# Patient Record
Sex: FEMALE
[De-identification: names, ages, dates, MRNs, and addresses within clinical notes are randomized per-mention and may not be internally consistent; named-entity substitution may affect disease eponyms.]

---

## 2017-02-01 ENCOUNTER — APPOINTMENT (OUTPATIENT)
Dept: ANTEPARTUM | Facility: CLINIC | Age: 32
End: 2017-02-01

## 2017-02-01 ENCOUNTER — ASOB RESULT (OUTPATIENT)
Age: 32
End: 2017-02-01

## 2017-03-14 ENCOUNTER — APPOINTMENT (OUTPATIENT)
Dept: ANTEPARTUM | Facility: CLINIC | Age: 32
End: 2017-03-14

## 2017-03-19 ENCOUNTER — ASOB RESULT (OUTPATIENT)
Age: 32
End: 2017-03-19

## 2017-03-19 ENCOUNTER — APPOINTMENT (OUTPATIENT)
Dept: ANTEPARTUM | Facility: CLINIC | Age: 32
End: 2017-03-19

## 2017-04-18 ENCOUNTER — OUTPATIENT (OUTPATIENT)
Dept: OUTPATIENT SERVICES | Facility: HOSPITAL | Age: 32
LOS: 1 days | Discharge: HOME | End: 2017-04-18

## 2017-04-18 LAB
APPEARANCE UR: CLEAR — SIGNIFICANT CHANGE UP
BACTERIA # UR AUTO: (no result)
BASOPHILS # BLD AUTO: 0.1 K/UL — SIGNIFICANT CHANGE UP (ref 0–0.2)
BASOPHILS NFR BLD AUTO: 0.7 % — SIGNIFICANT CHANGE UP (ref 0–2)
BILIRUB UR-MCNC: NEGATIVE — SIGNIFICANT CHANGE UP
BLD GP AB SCN SERPL QL: SIGNIFICANT CHANGE UP
COD CRY URNS QL: (no result)
COLOR SPEC: YELLOW — SIGNIFICANT CHANGE UP
DIFF PNL FLD: (no result)
EOSINOPHIL # BLD AUTO: 0.1 K/UL — SIGNIFICANT CHANGE UP (ref 0–0.5)
EOSINOPHIL NFR BLD AUTO: 0.8 % — SIGNIFICANT CHANGE UP (ref 0–6)
EPI CELLS # UR: (no result)
GLUCOSE UR QL: NEGATIVE MG/DL — SIGNIFICANT CHANGE UP
HCT VFR BLD CALC: 39.4 % — SIGNIFICANT CHANGE UP (ref 34.5–45)
HGB BLD-MCNC: 12.7 G/DL — SIGNIFICANT CHANGE UP (ref 11.5–15.5)
KETONES UR-MCNC: NEGATIVE — SIGNIFICANT CHANGE UP
LEUKOCYTE ESTERASE UR-ACNC: (no result)
LYMPHOCYTES # BLD AUTO: 18.3 % — SIGNIFICANT CHANGE UP (ref 13–44)
LYMPHOCYTES # BLD AUTO: 2.5 K/UL — SIGNIFICANT CHANGE UP (ref 1–3.3)
MCHC RBC-ENTMCNC: 30.4 PG — SIGNIFICANT CHANGE UP (ref 27–34)
MCHC RBC-ENTMCNC: 32.3 GM/DL — SIGNIFICANT CHANGE UP (ref 32–36)
MCV RBC AUTO: 94 FL — SIGNIFICANT CHANGE UP (ref 80–100)
MONOCYTES # BLD AUTO: 0.7 K/UL — SIGNIFICANT CHANGE UP (ref 0–0.9)
MONOCYTES NFR BLD AUTO: 5.2 % — SIGNIFICANT CHANGE UP (ref 2–14)
NEUTROPHILS # BLD AUTO: 10.2 K/UL — HIGH (ref 1.8–7.4)
NEUTROPHILS NFR BLD AUTO: 75 % — SIGNIFICANT CHANGE UP (ref 43–77)
NITRITE UR-MCNC: NEGATIVE — SIGNIFICANT CHANGE UP
PH UR: 6 — SIGNIFICANT CHANGE UP (ref 4.8–8)
PLATELET # BLD AUTO: 163 K/UL — SIGNIFICANT CHANGE UP (ref 150–400)
PROT UR-MCNC: NEGATIVE MG/DL — SIGNIFICANT CHANGE UP
RBC # BLD: 4.19 M/UL — SIGNIFICANT CHANGE UP (ref 3.8–5.2)
RBC # FLD: 12.8 % — SIGNIFICANT CHANGE UP (ref 10.3–14.5)
RBC CASTS # UR COMP ASSIST: (no result) /HPF (ref 0–4)
SP GR SPEC: 1.01 — SIGNIFICANT CHANGE UP (ref 1.01–1.02)
T PALLIDUM AB TITR SER: NEGATIVE — SIGNIFICANT CHANGE UP
TYPE + AB SCN PNL BLD: SIGNIFICANT CHANGE UP
UROBILINOGEN FLD QL: NEGATIVE MG/DL — SIGNIFICANT CHANGE UP
WBC # BLD: 13.6 K/UL — HIGH (ref 3.8–10.5)
WBC # FLD AUTO: 13.6 K/UL — HIGH (ref 3.8–10.5)
WBC UR QL: SIGNIFICANT CHANGE UP

## 2017-04-19 ENCOUNTER — INPATIENT (INPATIENT)
Facility: HOSPITAL | Age: 32
LOS: 2 days | Discharge: HOME | End: 2017-04-22
Attending: OBSTETRICS & GYNECOLOGY | Admitting: OBSTETRICS & GYNECOLOGY

## 2017-04-19 VITALS — WEIGHT: 138.89 LBS | HEIGHT: 62 IN

## 2017-04-19 LAB — T PALLIDUM AB TITR SER: NEGATIVE — SIGNIFICANT CHANGE UP

## 2017-04-19 RX ORDER — FERROUS SULFATE 325(65) MG
325 TABLET ORAL DAILY
Qty: 0 | Refills: 0 | Status: DISCONTINUED | OUTPATIENT
Start: 2017-04-19 | End: 2017-04-22

## 2017-04-19 RX ORDER — SODIUM CHLORIDE 9 MG/ML
1000 INJECTION, SOLUTION INTRAVENOUS ONCE
Qty: 0 | Refills: 0 | Status: COMPLETED | OUTPATIENT
Start: 2017-04-19 | End: 2017-04-19

## 2017-04-19 RX ORDER — ACETAMINOPHEN 500 MG
1000 TABLET ORAL EVERY 8 HOURS
Qty: 0 | Refills: 0 | Status: COMPLETED | OUTPATIENT
Start: 2017-04-19 | End: 2017-04-19

## 2017-04-19 RX ORDER — HYDROMORPHONE HYDROCHLORIDE 2 MG/ML
0.5 INJECTION INTRAMUSCULAR; INTRAVENOUS; SUBCUTANEOUS
Qty: 0 | Refills: 0 | Status: DISCONTINUED | OUTPATIENT
Start: 2017-04-19 | End: 2017-04-22

## 2017-04-19 RX ORDER — NALOXONE HYDROCHLORIDE 4 MG/.1ML
0.1 SPRAY NASAL
Qty: 0 | Refills: 0 | Status: DISCONTINUED | OUTPATIENT
Start: 2017-04-19 | End: 2017-04-22

## 2017-04-19 RX ORDER — DOCUSATE SODIUM 100 MG
100 CAPSULE ORAL
Qty: 0 | Refills: 0 | Status: DISCONTINUED | OUTPATIENT
Start: 2017-04-19 | End: 2017-04-22

## 2017-04-19 RX ORDER — DIPHENHYDRAMINE HCL 50 MG
25 CAPSULE ORAL EVERY 6 HOURS
Qty: 0 | Refills: 0 | Status: DISCONTINUED | OUTPATIENT
Start: 2017-04-19 | End: 2017-04-22

## 2017-04-19 RX ORDER — CEFAZOLIN SODIUM 1 G
2000 VIAL (EA) INJECTION ONCE
Qty: 0 | Refills: 0 | Status: COMPLETED | OUTPATIENT
Start: 2017-04-19 | End: 2017-04-19

## 2017-04-19 RX ORDER — OXYTOCIN 10 UNIT/ML
41.67 VIAL (ML) INJECTION
Qty: 20 | Refills: 0 | Status: DISCONTINUED | OUTPATIENT
Start: 2017-04-19 | End: 2017-04-22

## 2017-04-19 RX ORDER — OXYTOCIN 10 UNIT/ML
41.67 VIAL (ML) INJECTION
Qty: 20 | Refills: 0 | Status: DISCONTINUED | OUTPATIENT
Start: 2017-04-19 | End: 2017-04-19

## 2017-04-19 RX ORDER — MORPHINE SULFATE 50 MG/1
0.15 CAPSULE, EXTENDED RELEASE ORAL ONCE
Qty: 0 | Refills: 0 | Status: DISCONTINUED | OUTPATIENT
Start: 2017-04-19 | End: 2017-04-22

## 2017-04-19 RX ORDER — CITRIC ACID/SODIUM CITRATE 300-500 MG
30 SOLUTION, ORAL ORAL ONCE
Qty: 0 | Refills: 0 | Status: COMPLETED | OUTPATIENT
Start: 2017-04-19 | End: 2017-04-19

## 2017-04-19 RX ORDER — SODIUM CHLORIDE 9 MG/ML
1000 INJECTION, SOLUTION INTRAVENOUS
Qty: 0 | Refills: 0 | Status: DISCONTINUED | OUTPATIENT
Start: 2017-04-19 | End: 2017-04-19

## 2017-04-19 RX ORDER — ONDANSETRON 8 MG/1
4 TABLET, FILM COATED ORAL EVERY 6 HOURS
Qty: 0 | Refills: 0 | Status: DISCONTINUED | OUTPATIENT
Start: 2017-04-19 | End: 2017-04-22

## 2017-04-19 RX ORDER — SIMETHICONE 80 MG/1
80 TABLET, CHEWABLE ORAL EVERY 4 HOURS
Qty: 0 | Refills: 0 | Status: DISCONTINUED | OUTPATIENT
Start: 2017-04-19 | End: 2017-04-22

## 2017-04-19 RX ORDER — HYDROMORPHONE HYDROCHLORIDE 2 MG/ML
1.5 INJECTION INTRAMUSCULAR; INTRAVENOUS; SUBCUTANEOUS
Qty: 0 | Refills: 0 | Status: DISCONTINUED | OUTPATIENT
Start: 2017-04-19 | End: 2017-04-22

## 2017-04-19 RX ORDER — TETANUS TOXOID, REDUCED DIPHTHERIA TOXOID AND ACELLULAR PERTUSSIS VACCINE, ADSORBED 5; 2.5; 8; 8; 2.5 [IU]/.5ML; [IU]/.5ML; UG/.5ML; UG/.5ML; UG/.5ML
0.5 SUSPENSION INTRAMUSCULAR ONCE
Qty: 0 | Refills: 0 | Status: DISCONTINUED | OUTPATIENT
Start: 2017-04-19 | End: 2017-04-22

## 2017-04-19 RX ORDER — ACETAMINOPHEN 500 MG
650 TABLET ORAL EVERY 6 HOURS
Qty: 0 | Refills: 0 | Status: DISCONTINUED | OUTPATIENT
Start: 2017-04-19 | End: 2017-04-22

## 2017-04-19 RX ORDER — IBUPROFEN 200 MG
600 TABLET ORAL EVERY 6 HOURS
Qty: 0 | Refills: 0 | Status: DISCONTINUED | OUTPATIENT
Start: 2017-04-19 | End: 2017-04-22

## 2017-04-19 RX ORDER — LANOLIN
1 OINTMENT (GRAM) TOPICAL
Qty: 0 | Refills: 0 | Status: DISCONTINUED | OUTPATIENT
Start: 2017-04-19 | End: 2017-04-22

## 2017-04-19 RX ORDER — METOCLOPRAMIDE HCL 10 MG
10 TABLET ORAL ONCE
Qty: 0 | Refills: 0 | Status: DISCONTINUED | OUTPATIENT
Start: 2017-04-19 | End: 2017-04-22

## 2017-04-19 RX ORDER — GLYCERIN ADULT
1 SUPPOSITORY, RECTAL RECTAL AT BEDTIME
Qty: 0 | Refills: 0 | Status: DISCONTINUED | OUTPATIENT
Start: 2017-04-19 | End: 2017-04-22

## 2017-04-19 RX ORDER — OXYTOCIN 10 UNIT/ML
333.33 VIAL (ML) INJECTION
Qty: 20 | Refills: 0 | Status: COMPLETED | OUTPATIENT
Start: 2017-04-19 | End: 2017-04-19

## 2017-04-19 RX ADMIN — Medication 325 MILLIGRAM(S): at 21:07

## 2017-04-19 RX ADMIN — Medication 100 MILLIGRAM(S): at 08:35

## 2017-04-19 RX ADMIN — Medication 600 MILLIGRAM(S): at 21:07

## 2017-04-19 RX ADMIN — SODIUM CHLORIDE 125 MILLILITER(S): 9 INJECTION, SOLUTION INTRAVENOUS at 10:14

## 2017-04-19 RX ADMIN — Medication 30 MILLILITER(S): at 10:15

## 2017-04-19 RX ADMIN — Medication 1 TABLET(S): at 21:07

## 2017-04-19 RX ADMIN — Medication 400 MILLIGRAM(S): at 11:09

## 2017-04-19 RX ADMIN — SODIUM CHLORIDE 125 MILLILITER(S): 9 INJECTION, SOLUTION INTRAVENOUS at 18:22

## 2017-04-19 RX ADMIN — HYDROMORPHONE HYDROCHLORIDE 1.5 MILLIGRAM(S): 2 INJECTION INTRAMUSCULAR; INTRAVENOUS; SUBCUTANEOUS at 10:35

## 2017-04-19 RX ADMIN — Medication 100 MILLIGRAM(S): at 21:07

## 2017-04-19 RX ADMIN — Medication 1000 MILLIUNIT(S)/MIN: at 10:51

## 2017-04-19 RX ADMIN — Medication 125 MILLIUNIT(S)/MIN: at 10:52

## 2017-04-19 RX ADMIN — Medication 600 MILLIGRAM(S): at 21:49

## 2017-04-19 RX ADMIN — SODIUM CHLORIDE 2000 MILLILITER(S): 9 INJECTION, SOLUTION INTRAVENOUS at 10:13

## 2017-04-20 LAB
BASOPHILS # BLD AUTO: 0.1 K/UL — SIGNIFICANT CHANGE UP (ref 0–0.2)
BASOPHILS NFR BLD AUTO: 0.7 % — SIGNIFICANT CHANGE UP (ref 0–2)
EOSINOPHIL # BLD AUTO: 0.1 K/UL — SIGNIFICANT CHANGE UP (ref 0–0.5)
EOSINOPHIL NFR BLD AUTO: 0.6 % — SIGNIFICANT CHANGE UP (ref 0–6)
HCT VFR BLD CALC: 31.4 % — LOW (ref 34.5–45)
HGB BLD-MCNC: 10.1 G/DL — LOW (ref 11.5–15.5)
LYMPHOCYTES # BLD AUTO: 22.9 % — SIGNIFICANT CHANGE UP (ref 13–44)
LYMPHOCYTES # BLD AUTO: 3.2 K/UL — SIGNIFICANT CHANGE UP (ref 1–3.3)
MCHC RBC-ENTMCNC: 30.2 PG — SIGNIFICANT CHANGE UP (ref 27–34)
MCHC RBC-ENTMCNC: 32.3 GM/DL — SIGNIFICANT CHANGE UP (ref 32–36)
MCV RBC AUTO: 93.5 FL — SIGNIFICANT CHANGE UP (ref 80–100)
MONOCYTES # BLD AUTO: 0.9 K/UL — SIGNIFICANT CHANGE UP (ref 0–0.9)
MONOCYTES NFR BLD AUTO: 6.6 % — SIGNIFICANT CHANGE UP (ref 2–14)
NEUTROPHILS # BLD AUTO: 9.6 K/UL — HIGH (ref 1.8–7.4)
NEUTROPHILS NFR BLD AUTO: 69.2 % — SIGNIFICANT CHANGE UP (ref 43–77)
PLATELET # BLD AUTO: 140 K/UL — LOW (ref 150–400)
RBC # BLD: 3.35 M/UL — LOW (ref 3.8–5.2)
RBC # FLD: 12.6 % — SIGNIFICANT CHANGE UP (ref 10.3–14.5)
WBC # BLD: 13.9 K/UL — HIGH (ref 3.8–10.5)
WBC # FLD AUTO: 13.9 K/UL — HIGH (ref 3.8–10.5)

## 2017-04-20 RX ADMIN — Medication 1 TABLET(S): at 12:14

## 2017-04-20 RX ADMIN — Medication 100 MILLIGRAM(S): at 18:34

## 2017-04-20 RX ADMIN — Medication 600 MILLIGRAM(S): at 12:14

## 2017-04-20 RX ADMIN — Medication 600 MILLIGRAM(S): at 19:20

## 2017-04-20 RX ADMIN — Medication 600 MILLIGRAM(S): at 06:05

## 2017-04-20 RX ADMIN — Medication 600 MILLIGRAM(S): at 18:34

## 2017-04-20 RX ADMIN — Medication 600 MILLIGRAM(S): at 07:09

## 2017-04-20 NOTE — PROGRESS NOTE ADULT - SUBJECTIVE AND OBJECTIVE BOX
Postpartum Note,  Section  She is a  31y woman who is now post-operative day: #1    Subjective:  The patient feels well.  She is ambulating.   She is tolerating regular diet.  She denies nausea and vomiting.  She is voiding.  Her pain is controlled.  She reports normal postpartum bleeding.        Physical exam:    Vital Signs Last 24 Hrs  T(C): 36.5, Max: 36.8 (04-19 @ 12:16)  T(F): 97.7, Max: 98.2 (04-19 @ 12:16)  HR: 68 (57 - 69)  BP: 107/56 (105/56 - 136/70)  BP(mean): --  RR: 16 (16 - 18)  SpO2: 97% (97% - 98%)    Gen: NAD  Breast: Soft, nontender, not engorged.  Abdomen: Soft, nontender, no distension , firm uterine fundus at umbilicus.  Incision: Clean, dry, and intact with steri strips  Pelvic: Normal lochia noted  Ext: No calf tenderness    LABS:                        10.1   13.9  )-----------( 140      ( 2017 05:49 )             31.4       Rubella status:     Allergies    No Known Drug Allergies  Tree Nuts (Other)    Intolerances      MEDICATIONS  (STANDING):  diphtheria/tetanus/pertussis (acellular) Vaccine (ADAcel) 0.5milliLiter(s) IntraMuscular once  oxytocin Infusion 41.667milliUNIT(s)/Min IV Continuous <Continuous>  ferrous    sulfate 325milliGRAM(s) Oral daily  prenatal multivitamin 1Tablet(s) Oral daily  morphine PF Spinal 0.15milliGRAM(s) IntraThecal. once    MEDICATIONS  (PRN):  metoclopramide Injectable 10milliGRAM(s) IV Push once PRN Nausea and/or Vomiting  acetaminophen   Tablet 650milliGRAM(s) Oral every 6 hours PRN For Temp greater than 38.5 C (101.3 F)  ibuprofen  Tablet 600milliGRAM(s) Oral every 6 hours PRN Mild pain or headache  oxyCODONE  5 mG/acetaminophen 325 mG 1Tablet(s) Oral every 3 hours PRN Moderate Pain  oxyCODONE  5 mG/acetaminophen 325 mG 2Tablet(s) Oral every 6 hours PRN Severe Pain  simethicone 80milliGRAM(s) Chew every 4 hours PRN Gas  diphenhydrAMINE   Capsule 25milliGRAM(s) Oral every 6 hours PRN Itching  glycerin Suppository - Adult 1Suppository(s) Rectal at bedtime PRN Constipation  docusate sodium 100milliGRAM(s) Oral two times a day PRN Stool Softening  lanolin Ointment 1Application(s) Topical every 3 hours PRN Sore Nipples  HYDROmorphone  Injectable 1.5milliGRAM(s) SubCutaneous every 3 hours PRN Severe Pain  HYDROmorphone  Injectable 0.5milliGRAM(s) IV Push every 10 minutes PRN Moderate Pain  naloxone Injectable 0.1milliGRAM(s) IV Push every 3 minutes PRN For ANY of the following changes in patient status:  A. RR LESS THAN 10 breaths per minute, B. Oxygen saturation LESS THAN 90%, C. Sedation score of 6  ondansetron Injectable 4milliGRAM(s) IV Push every 6 hours PRN Nausea        Assessment and Plan  POD # s/p 1  Doing well.  Encourage ambulation.  Cont progressive care

## 2017-04-21 LAB
HCT VFR BLD CALC: 30.7 % — LOW (ref 34.5–45)
HGB BLD-MCNC: 10.2 G/DL — LOW (ref 11.5–15.5)
MCHC RBC-ENTMCNC: 31 PG — SIGNIFICANT CHANGE UP (ref 27–34)
MCHC RBC-ENTMCNC: 33.1 GM/DL — SIGNIFICANT CHANGE UP (ref 32–36)
MCV RBC AUTO: 93.5 FL — SIGNIFICANT CHANGE UP (ref 80–100)
PLATELET # BLD AUTO: 155 K/UL — SIGNIFICANT CHANGE UP (ref 150–400)
RBC # BLD: 3.28 M/UL — LOW (ref 3.8–5.2)
RBC # FLD: 12.8 % — SIGNIFICANT CHANGE UP (ref 10.3–14.5)
WBC # BLD: 10.4 K/UL — SIGNIFICANT CHANGE UP (ref 3.8–10.5)
WBC # FLD AUTO: 10.4 K/UL — SIGNIFICANT CHANGE UP (ref 3.8–10.5)

## 2017-04-21 RX ORDER — MOMETASONE FUROATE 220 UG/1
1 INHALANT RESPIRATORY (INHALATION) DAILY
Qty: 0 | Refills: 0 | Status: DISCONTINUED | OUTPATIENT
Start: 2017-04-21 | End: 2017-04-22

## 2017-04-21 RX ORDER — MONTELUKAST 4 MG/1
10 TABLET, CHEWABLE ORAL AT BEDTIME
Qty: 0 | Refills: 0 | Status: DISCONTINUED | OUTPATIENT
Start: 2017-04-21 | End: 2017-04-22

## 2017-04-21 RX ADMIN — Medication 100 MILLIGRAM(S): at 10:41

## 2017-04-21 RX ADMIN — MONTELUKAST 10 MILLIGRAM(S): 4 TABLET, CHEWABLE ORAL at 22:19

## 2017-04-21 RX ADMIN — Medication 600 MILLIGRAM(S): at 14:21

## 2017-04-21 RX ADMIN — Medication 600 MILLIGRAM(S): at 12:33

## 2017-04-21 RX ADMIN — Medication 600 MILLIGRAM(S): at 18:45

## 2017-04-21 RX ADMIN — Medication 600 MILLIGRAM(S): at 07:26

## 2017-04-21 RX ADMIN — Medication 1 TABLET(S): at 12:32

## 2017-04-21 RX ADMIN — Medication 100 MILLIGRAM(S): at 22:19

## 2017-04-21 RX ADMIN — Medication 600 MILLIGRAM(S): at 19:28

## 2017-04-21 RX ADMIN — Medication 600 MILLIGRAM(S): at 06:36

## 2017-04-21 NOTE — PROGRESS NOTE ADULT - SUBJECTIVE AND OBJECTIVE BOX
Patient evaluated at bedside approx 915am .   She reports pain is well controlled with pain meds. She has no complaints  She denies headache, dizziness, chest pain, palpitations, shortness of breath, nausea, vomiting, heavy vaginal bleeding.  She has been ambulating without assistance, voiding spontaneously, tolerating diet, and is breastfeeding. She is passing flatus, no BM.    Physical Exam:  Vital Signs Last 24 Hrs  T(C): 36.6, Max: 36.8 (04-20 @ 16:09)  T(F): 97.8, Max: 98.3 (04-20 @ 16:09)  HR: 65 (64 - 71)  BP: 112/65 (102/63 - 113/71)  BP(mean): --  RR: 16 (16 - 16)  SpO2: 98% (97% - 98%)  I&O's Summary    I & Os for current day (as of 21 Apr 2017 09:52)  =============================================  IN: 240 ml / OUT: 300 ml / NET: -60 ml      NAD, A+0 x 3  Breasts: soft, nontender, no palpable masses, nipples intact and everted  Abd: + BS, soft, nontender, nondistended, no rebound or guarding, uterus firm at umbilicus  Pfannenstiel incision Clean, dry with staples  : lochia WNL  Extremities: no swelling or calf tenderness,                           10.1   13.9  )-----------( 140      ( 20 Apr 2017 05:49 )             31.4                         12.7   13.6  )-----------( 163      ( 18 Apr 2017 08:44 )             39.4                 Assessment:  PPD @ s/p repeat Csection doing well  labs wnl  breastfeeding well    Plan:  Continue current management  anticipate discharge home tomorrow

## 2017-04-21 NOTE — PROGRESS NOTE ADULT - SUBJECTIVE AND OBJECTIVE BOX
Postpartum Note,  Section  She is a  31y woman who is now post-operative day: 3    Subjective:  The patient feels well.  She is ambulating without difficulty.  She is tolerating PO.  She is voiding.  She denies nausea and vomiting.  Her pain is controlled.  She reports normal postpartum bleeding  passing flatus      Physical exam:    Vital Signs Last 24 Hrs  T(C): 36.6, Max: 36.8 (04-20 @ 16:09)  T(F): 97.8, Max: 98.3 (04-20 @ 16:09)  HR: 65 (64 - 69)  BP: 112/65 (102/63 - 112/65)  BP(mean): --  RR: 16 (16 - 16)  SpO2: 98% (97% - 98%)    Gen: NAD  Breast: Soft, nontender, non engorged  Abdomen: Soft, nontender, no distension , firm uterine fundus at umbilicus.  Incision: Clean, dry, and intact with steri strips  Pelvic: Normal lochia noted  Ext: No calf tenderness    LABS:                        10.1   13.9  )-----------( 140      ( 2017 05:49 )             31.4     04-18 @ 08:44   Antibody Screen: NEG  Type + Screen: O POS      Intolerances      MEDICATIONS  (STANDING):  diphtheria/tetanus/pertussis (acellular) Vaccine (ADAcel) 0.5milliLiter(s) IntraMuscular once  oxytocin Infusion 41.667milliUNIT(s)/Min IV Continuous <Continuous>  ferrous    sulfate 325milliGRAM(s) Oral daily  prenatal multivitamin 1Tablet(s) Oral daily  morphine PF Spinal 0.15milliGRAM(s) IntraThecal. once    MEDICATIONS  (PRN):  metoclopramide Injectable 10milliGRAM(s) IV Push once PRN Nausea and/or Vomiting  acetaminophen   Tablet 650milliGRAM(s) Oral every 6 hours PRN For Temp greater than 38.5 C (101.3 F)  ibuprofen  Tablet 600milliGRAM(s) Oral every 6 hours PRN Mild pain or headache  oxyCODONE  5 mG/acetaminophen 325 mG 1Tablet(s) Oral every 3 hours PRN Moderate Pain  oxyCODONE  5 mG/acetaminophen 325 mG 2Tablet(s) Oral every 6 hours PRN Severe Pain  simethicone 80milliGRAM(s) Chew every 4 hours PRN Gas  diphenhydrAMINE   Capsule 25milliGRAM(s) Oral every 6 hours PRN Itching  glycerin Suppository - Adult 1Suppository(s) Rectal at bedtime PRN Constipation  docusate sodium 100milliGRAM(s) Oral two times a day PRN Stool Softening  lanolin Ointment 1Application(s) Topical every 3 hours PRN Sore Nipples  HYDROmorphone  Injectable 1.5milliGRAM(s) SubCutaneous every 3 hours PRN Severe Pain  HYDROmorphone  Injectable 0.5milliGRAM(s) IV Push every 10 minutes PRN Moderate Pain  naloxone Injectable 0.1milliGRAM(s) IV Push every 3 minutes PRN For ANY of the following changes in patient status:  A. RR LESS THAN 10 breaths per minute, B. Oxygen saturation LESS THAN 90%, C. Sedation score of 6  ondansetron Injectable 4milliGRAM(s) IV Push every 6 hours PRN Nausea        Assessment and Plan  POD # 3 s/p   Doing well.  Encourage ambulation.  Discharge home in am  Prescriptions for percocet and motrin electronically sent to the pharmacy.  F/U in 2 weeks for incision check.  Call for fevers, chills, nausea, vomiting, heavy vaginal bleeding, vaginal discharge, severe pain, symptoms of depression, problems with incision or any other concerning symptoms.  Nothing in vagina and no heavy lifting x 6 weeks.  No driving x 2 weeks. Postpartum Note,  Section  She is a  31y woman who is now post-operative day: 3  no H/A N/V abd pain   Subjective:  The patient feels well.  She is ambulating without difficulty.  She is tolerating PO.  She is voiding.  She denies nausea and vomiting.  Her pain is controlled.  She reports normal postpartum bleeding  passing flatus      Physical exam:    Vital Signs Last 24 Hrs  T(C): 36.6, Max: 36.8 (04-20 @ 16:09)  T(F): 97.8, Max: 98.3 (04-20 @ 16:09)  HR: 65 (64 - 69)  BP: 112/65 (102/63 - 112/65)  BP(mean): --  RR: 16 (16 - 16)  SpO2: 98% (97% - 98%)    Gen: NAD  Breast: Soft, nontender, non engorged  Abdomen: Soft, nontender, no distension , firm uterine fundus at umbilicus.  Incision: Clean, dry, and intact with steri strips  Pelvic: Normal lochia noted  Ext: No calf tenderness    LABS:                        10.1   13.9  )-----------( 140      ( 2017 05:49 )             31.4     04-18 @ 08:44   Antibody Screen: NEG  Type + Screen: O POS      Intolerances      MEDICATIONS  (STANDING):  diphtheria/tetanus/pertussis (acellular) Vaccine (ADAcel) 0.5milliLiter(s) IntraMuscular once  oxytocin Infusion 41.667milliUNIT(s)/Min IV Continuous <Continuous>  ferrous    sulfate 325milliGRAM(s) Oral daily  prenatal multivitamin 1Tablet(s) Oral daily  morphine PF Spinal 0.15milliGRAM(s) IntraThecal. once    MEDICATIONS  (PRN):  metoclopramide Injectable 10milliGRAM(s) IV Push once PRN Nausea and/or Vomiting  acetaminophen   Tablet 650milliGRAM(s) Oral every 6 hours PRN For Temp greater than 38.5 C (101.3 F)  ibuprofen  Tablet 600milliGRAM(s) Oral every 6 hours PRN Mild pain or headache  oxyCODONE  5 mG/acetaminophen 325 mG 1Tablet(s) Oral every 3 hours PRN Moderate Pain  oxyCODONE  5 mG/acetaminophen 325 mG 2Tablet(s) Oral every 6 hours PRN Severe Pain  simethicone 80milliGRAM(s) Chew every 4 hours PRN Gas  diphenhydrAMINE   Capsule 25milliGRAM(s) Oral every 6 hours PRN Itching  glycerin Suppository - Adult 1Suppository(s) Rectal at bedtime PRN Constipation  docusate sodium 100milliGRAM(s) Oral two times a day PRN Stool Softening  lanolin Ointment 1Application(s) Topical every 3 hours PRN Sore Nipples  HYDROmorphone  Injectable 1.5milliGRAM(s) SubCutaneous every 3 hours PRN Severe Pain  HYDROmorphone  Injectable 0.5milliGRAM(s) IV Push every 10 minutes PRN Moderate Pain  naloxone Injectable 0.1milliGRAM(s) IV Push every 3 minutes PRN For ANY of the following changes in patient status:  A. RR LESS THAN 10 breaths per minute, B. Oxygen saturation LESS THAN 90%, C. Sedation score of 6  ondansetron Injectable 4milliGRAM(s) IV Push every 6 hours PRN Nausea        Assessment and Plan  POD # 3 s/p Check cbc today  Doing well.  Encourage ambulation.  Discharge home in am  Prescriptions for percocet and motrin electronically sent to the pharmacy.  F/U in 2 weeks for incision check.  Call for fevers, chills, nausea, vomiting, heavy vaginal bleeding, vaginal discharge, severe pain, symptoms of depression, problems with incision or any other concerning symptoms.  Nothing in vagina and no heavy lifting x 6 weeks.  No driving x 2 weeks.

## 2017-04-22 ENCOUNTER — TRANSCRIPTION ENCOUNTER (OUTPATIENT)
Age: 32
End: 2017-04-22

## 2017-04-22 VITALS — DIASTOLIC BLOOD PRESSURE: 77 MMHG | HEART RATE: 64 BPM | SYSTOLIC BLOOD PRESSURE: 130 MMHG

## 2017-04-22 RX ORDER — FOLIC ACID 0.8 MG
1 TABLET ORAL
Qty: 0 | Refills: 0 | COMMUNITY

## 2017-04-22 RX ORDER — IBUPROFEN 200 MG
1 TABLET ORAL
Qty: 0 | Refills: 0 | DISCHARGE
Start: 2017-04-22

## 2017-04-22 RX ORDER — LANOLIN
1 OINTMENT (GRAM) TOPICAL
Qty: 0 | Refills: 0 | DISCHARGE
Start: 2017-04-22

## 2017-04-22 RX ORDER — ASPIRIN/CALCIUM CARB/MAGNESIUM 324 MG
1 TABLET ORAL
Qty: 0 | Refills: 0 | COMMUNITY

## 2017-04-22 RX ORDER — DOCUSATE SODIUM 100 MG
1 CAPSULE ORAL
Qty: 0 | Refills: 0 | DISCHARGE
Start: 2017-04-22

## 2017-04-22 RX ORDER — ACETAMINOPHEN 500 MG
2 TABLET ORAL
Qty: 0 | Refills: 0 | DISCHARGE
Start: 2017-04-22

## 2017-04-22 RX ORDER — FERROUS SULFATE 325(65) MG
1 TABLET ORAL
Qty: 0 | Refills: 0 | COMMUNITY

## 2017-04-22 RX ADMIN — MOMETASONE FUROATE 1 PUFF(S): 220 INHALANT RESPIRATORY (INHALATION) at 10:21

## 2017-04-22 RX ADMIN — Medication 325 MILLIGRAM(S): at 12:05

## 2017-04-22 RX ADMIN — Medication 100 MILLIGRAM(S): at 12:05

## 2017-04-22 RX ADMIN — Medication 600 MILLIGRAM(S): at 08:38

## 2017-04-22 RX ADMIN — Medication 1 TABLET(S): at 12:06

## 2017-04-22 NOTE — DISCHARGE NOTE OB - HOSPITAL COURSE
30 Y/O W/F G 2 P1  at 39.4weeks previous c/s for elective repeart c/s. She was delivered of a viable female infant. Postop course has been benign.

## 2017-04-22 NOTE — DISCHARGE NOTE OB - PATIENT PORTAL LINK FT
“You can access the Follow[employer] Patient Portal, offered by [facility], by registering with the following website: [email or web address]”

## 2017-04-22 NOTE — DISCHARGE NOTE OB - CARE PLAN
Principal Discharge DX:	39 weeks gestation of pregnancy  Goal:	Safe delivery of patient  Instructions for follow-up, activity and diet:	Nothing in the vagina, no tampons, douches, baths, swimming or sex for 6-8 weeks.  Regular diet, follow up visit in 1 weeks  Secondary Diagnosis:	Previous  section

## 2017-04-22 NOTE — DISCHARGE NOTE OB - CARE PROVIDER_API CALL
Princess Mike (DO), Obstetrics and Gynecology  [address] [place] [address]  Phone: [phone]  Fax: [phone]

## 2017-04-22 NOTE — DISCHARGE NOTE OB - CARE PROVIDERS DIRECT ADDRESSES
[email or web address]

## 2017-04-22 NOTE — DISCHARGE NOTE OB - MEDICATION SUMMARY - MEDICATIONS TO STOP TAKING
I will STOP taking the medications listed below when I get home from the hospital:    folic acid 1 mg oral tablet  -- 1 tab(s) by mouth once a day    Aspirin Enteric Coated 81 mg oral delayed release tablet  -- 1 tab(s) by mouth once a day    ferrous sulfate 325 mg (65 mg elemental iron) oral delayed release tablet  -- 1 tab(s) by mouth once a day

## 2017-04-22 NOTE — DISCHARGE NOTE OB - MEDICATION SUMMARY - MEDICATIONS TO TAKE
I will START or STAY ON the medications listed below when I get home from the hospital:    acetaminophen 325 mg oral tablet  -- 2 tab(s) by mouth every 6 hours, As needed, For Temp greater than 38.5 C (101.3 F)  -- Indication: For Pain    ibuprofen 600 mg oral tablet  -- 1 tab(s) by mouth every 6 hours, As needed, Mild pain or headache  -- Indication: For Pain    Allegra Allergy 60 mg oral tablet  -- 1 tab(s) by mouth once a day  -- Indication: For allergies    Ventolin HFA 90 mcg/inh inhalation aerosol  -- 2 puff(s) inhaled 4 times a day, As Needed  -- Indication: For asthma    lanolin topical ointment  -- 1 application on skin every 3 hours, As needed, Sore Nipples  -- Indication: For sore nipples    Prenatal Multivitamins with Folic Acid 1 mg oral tablet  -- 1 tab(s) by mouth once a day  -- Indication: For vitamin    docusate sodium 100 mg oral capsule  -- 1 cap(s) by mouth 2 times a day, As needed, Stool Softening  -- Indication: For stool softner    Singulair 10 mg oral tablet  -- 1 tab(s) by mouth once a day  -- Indication: For allergies    Asmanex Twisthaler 60 Dose 220 mcg/inh inhalation aerosol powder  -- 1 puff(s) inhaled once a day (in the evening)  -- Indication: For asthma

## 2017-04-22 NOTE — DISCHARGE NOTE OB - PLAN OF CARE
Safe delivery of patient Nothing in the vagina, no tampons, douches, baths, swimming or sex for 6-8 weeks.  Regular diet, follow up visit in 1 weeks

## 2017-04-25 DIAGNOSIS — O34.211 MATERNAL CARE FOR LOW TRANSVERSE SCAR FROM PREVIOUS CESAREAN DELIVERY: ICD-10-CM

## 2017-04-25 DIAGNOSIS — Z3A.39 39 WEEKS GESTATION OF PREGNANCY: ICD-10-CM

## 2017-04-28 DIAGNOSIS — E72.12 METHYLENETETRAHYDROFOLATE REDUCTASE DEFICIENCY: ICD-10-CM

## 2017-04-28 DIAGNOSIS — J45.909 UNSPECIFIED ASTHMA, UNCOMPLICATED: ICD-10-CM

## 2020-12-29 ENCOUNTER — TRANSCRIPTION ENCOUNTER (OUTPATIENT)
Age: 35
End: 2020-12-29

## 2021-01-02 ENCOUNTER — TRANSCRIPTION ENCOUNTER (OUTPATIENT)
Age: 36
End: 2021-01-02

## 2021-02-05 ENCOUNTER — TRANSCRIPTION ENCOUNTER (OUTPATIENT)
Age: 36
End: 2021-02-05

## 2021-02-06 ENCOUNTER — APPOINTMENT (OUTPATIENT)
Dept: DISASTER EMERGENCY | Facility: CLINIC | Age: 36
End: 2021-02-06

## 2021-02-06 DIAGNOSIS — Z01.818 ENCOUNTER FOR OTHER PREPROCEDURAL EXAMINATION: ICD-10-CM

## 2021-02-19 ENCOUNTER — OUTPATIENT (OUTPATIENT)
Dept: OUTPATIENT SERVICES | Facility: HOSPITAL | Age: 36
LOS: 1 days | End: 2021-02-19

## 2021-02-19 VITALS
DIASTOLIC BLOOD PRESSURE: 60 MMHG | OXYGEN SATURATION: 99 % | RESPIRATION RATE: 16 BRPM | SYSTOLIC BLOOD PRESSURE: 100 MMHG | HEART RATE: 65 BPM | WEIGHT: 128.09 LBS | TEMPERATURE: 98 F | HEIGHT: 62 IN

## 2021-02-19 DIAGNOSIS — N20.0 CALCULUS OF KIDNEY: ICD-10-CM

## 2021-02-19 DIAGNOSIS — J45.909 UNSPECIFIED ASTHMA, UNCOMPLICATED: ICD-10-CM

## 2021-02-19 DIAGNOSIS — Z98.890 OTHER SPECIFIED POSTPROCEDURAL STATES: Chronic | ICD-10-CM

## 2021-02-19 DIAGNOSIS — Z98.891 HISTORY OF UTERINE SCAR FROM PREVIOUS SURGERY: Chronic | ICD-10-CM

## 2021-02-19 LAB
ANION GAP SERPL CALC-SCNC: 9 MMOL/L — SIGNIFICANT CHANGE UP (ref 7–14)
APPEARANCE UR: ABNORMAL
BILIRUB UR-MCNC: NEGATIVE — SIGNIFICANT CHANGE UP
BLD GP AB SCN SERPL QL: NEGATIVE — SIGNIFICANT CHANGE UP
BUN SERPL-MCNC: 13 MG/DL — SIGNIFICANT CHANGE UP (ref 7–23)
CALCIUM SERPL-MCNC: 11.5 MG/DL — HIGH (ref 8.4–10.5)
CHLORIDE SERPL-SCNC: 107 MMOL/L — SIGNIFICANT CHANGE UP (ref 98–107)
CO2 SERPL-SCNC: 25 MMOL/L — SIGNIFICANT CHANGE UP (ref 22–31)
COLOR SPEC: ABNORMAL
CREAT SERPL-MCNC: 0.68 MG/DL — SIGNIFICANT CHANGE UP (ref 0.5–1.3)
DIFF PNL FLD: ABNORMAL
GLUCOSE SERPL-MCNC: 86 MG/DL — SIGNIFICANT CHANGE UP (ref 70–99)
GLUCOSE UR QL: NEGATIVE — SIGNIFICANT CHANGE UP
HCG UR QL: NEGATIVE — SIGNIFICANT CHANGE UP
HCT VFR BLD CALC: 38.6 % — SIGNIFICANT CHANGE UP (ref 34.5–45)
HGB BLD-MCNC: 12.3 G/DL — SIGNIFICANT CHANGE UP (ref 11.5–15.5)
KETONES UR-MCNC: NEGATIVE — SIGNIFICANT CHANGE UP
LEUKOCYTE ESTERASE UR-ACNC: ABNORMAL
MCHC RBC-ENTMCNC: 29.4 PG — SIGNIFICANT CHANGE UP (ref 27–34)
MCHC RBC-ENTMCNC: 31.9 GM/DL — LOW (ref 32–36)
MCV RBC AUTO: 92.1 FL — SIGNIFICANT CHANGE UP (ref 80–100)
NITRITE UR-MCNC: POSITIVE
NRBC # BLD: 0 /100 WBCS — SIGNIFICANT CHANGE UP
NRBC # FLD: 0 K/UL — SIGNIFICANT CHANGE UP
PH UR: 6.5 — SIGNIFICANT CHANGE UP (ref 5–8)
PLATELET # BLD AUTO: 272 K/UL — SIGNIFICANT CHANGE UP (ref 150–400)
POTASSIUM SERPL-MCNC: 4.5 MMOL/L — SIGNIFICANT CHANGE UP (ref 3.5–5.3)
POTASSIUM SERPL-SCNC: 4.5 MMOL/L — SIGNIFICANT CHANGE UP (ref 3.5–5.3)
PROT UR-MCNC: ABNORMAL
RBC # BLD: 4.19 M/UL — SIGNIFICANT CHANGE UP (ref 3.8–5.2)
RBC # FLD: 12.4 % — SIGNIFICANT CHANGE UP (ref 10.3–14.5)
RH IG SCN BLD-IMP: POSITIVE — SIGNIFICANT CHANGE UP
SODIUM SERPL-SCNC: 141 MMOL/L — SIGNIFICANT CHANGE UP (ref 135–145)
SP GR SPEC: 1.01 — SIGNIFICANT CHANGE UP (ref 1.01–1.02)
UROBILINOGEN FLD QL: SIGNIFICANT CHANGE UP
WBC # BLD: 8.9 K/UL — SIGNIFICANT CHANGE UP (ref 3.8–10.5)
WBC # FLD AUTO: 8.9 K/UL — SIGNIFICANT CHANGE UP (ref 3.8–10.5)

## 2021-02-19 RX ORDER — FEXOFENADINE HCL 30 MG
1 TABLET ORAL
Qty: 0 | Refills: 0 | DISCHARGE

## 2021-02-19 RX ORDER — ALBUTEROL 90 UG/1
2 AEROSOL, METERED ORAL
Qty: 0 | Refills: 0 | DISCHARGE

## 2021-02-19 RX ORDER — MOMETASONE FUROATE 220 UG/1
1 INHALANT RESPIRATORY (INHALATION)
Qty: 0 | Refills: 0 | DISCHARGE

## 2021-02-19 RX ORDER — MONTELUKAST 4 MG/1
1 TABLET, CHEWABLE ORAL
Qty: 0 | Refills: 0 | DISCHARGE

## 2021-02-19 NOTE — H&P PST ADULT - NEGATIVE ENMT SYMPTOMS
no hearing difficulty/no sinus symptoms/no nasal congestion/no post-nasal discharge/no throat pain/no dysphagia

## 2021-02-19 NOTE — H&P PST ADULT - HISTORY OF PRESENT ILLNESS
35 y.o. female with hx of asthma, hyperparathyroidism, reports hx of microscopic hematuria, intermittent right flank pain, preop diagnosis of calculus of kidney, scheduled for cystoscopy right percutaneous nephrolithotomy

## 2021-02-19 NOTE — H&P PST ADULT - NSICDXPASTSURGICALHX_GEN_ALL_CORE_FT
PAST SURGICAL HISTORY:  H/O abdominoplasty     H/O arthroscopy right knee     H/O basal cell carcinoma excision forehead, 2020    H/O:  2015, 2017

## 2021-02-19 NOTE — H&P PST ADULT - NSANTHOSAYNRD_GEN_A_CORE
Home No. JUAN screening performed.  STOP BANG Legend: 0-2 = LOW Risk; 3-4 = INTERMEDIATE Risk; 5-8 = HIGH Risk

## 2021-02-19 NOTE — H&P PST ADULT - RESPIRATORY AND THORAX COMMENTS
hx of asthma diagnosed as a child, denies recent exacerbations, denies hx of intubation or hospitalization for asthma

## 2021-02-19 NOTE — H&P PST ADULT - NSICDXPROBLEM_GEN_ALL_CORE_FT
PROBLEM DIAGNOSES  Problem: Calculus of kidney  Assessment and Plan: pt scheduled for cystoscopy right percutaneous nephrolithotomy on 03/09/21  Preop instructions provided. Pt verbalized understanding.   Pepcid for GI prophylaxis with written and verbal instruction provided    written and verbal instructions with teach back on chlorhexidine shampoo provided,  pt verbalized understanding with returned demonstration   pt aware of need for COVID testing prior to procedure and advised to coordinate with surgeon     Problem: Asthma  Assessment and Plan: continue meds as prescribed, take allegra on the morning of the procedure

## 2021-02-19 NOTE — H&P PST ADULT - NSICDXPASTMEDICALHX_GEN_ALL_CORE_FT
PAST MEDICAL HISTORY:  Asthma     Calculus of kidney     H/O basal cell carcinoma excision     H/O hyperparathyroidism pending surgery on 02/23/21

## 2021-02-21 LAB
CULTURE RESULTS: SIGNIFICANT CHANGE UP
SPECIMEN SOURCE: SIGNIFICANT CHANGE UP

## 2021-03-01 PROBLEM — Z86.39 PERSONAL HISTORY OF OTHER ENDOCRINE, NUTRITIONAL AND METABOLIC DISEASE: Chronic | Status: ACTIVE | Noted: 2021-02-19

## 2021-03-01 PROBLEM — N20.0 CALCULUS OF KIDNEY: Chronic | Status: ACTIVE | Noted: 2021-02-19

## 2021-03-01 PROBLEM — J45.909 UNSPECIFIED ASTHMA, UNCOMPLICATED: Chronic | Status: ACTIVE | Noted: 2021-02-19

## 2021-03-01 PROBLEM — Z98.890 OTHER SPECIFIED POSTPROCEDURAL STATES: Chronic | Status: ACTIVE | Noted: 2021-02-19

## 2021-03-06 ENCOUNTER — APPOINTMENT (OUTPATIENT)
Dept: DISASTER EMERGENCY | Facility: CLINIC | Age: 36
End: 2021-03-06

## 2021-03-07 LAB — SARS-COV-2 N GENE NPH QL NAA+PROBE: NOT DETECTED

## 2021-03-08 ENCOUNTER — TRANSCRIPTION ENCOUNTER (OUTPATIENT)
Age: 36
End: 2021-03-08

## 2021-03-08 NOTE — ASU PATIENT PROFILE, ADULT - PMH
Asthma    Calculus of kidney    H/O basal cell carcinoma excision    H/O hyperparathyroidism  pending surgery on 02/23/21

## 2021-03-08 NOTE — ASU PATIENT PROFILE, ADULT - PSH
H/O abdominoplasty    H/O arthroscopy  right knee   H/O basal cell carcinoma excision  forehead, 2020  H/O:   2015, 2017

## 2021-03-09 ENCOUNTER — OUTPATIENT (OUTPATIENT)
Dept: OUTPATIENT SERVICES | Facility: HOSPITAL | Age: 36
LOS: 1 days | Discharge: HOME | End: 2021-03-09

## 2021-03-09 VITALS
DIASTOLIC BLOOD PRESSURE: 62 MMHG | HEART RATE: 87 BPM | SYSTOLIC BLOOD PRESSURE: 113 MMHG | OXYGEN SATURATION: 100 % | RESPIRATION RATE: 15 BRPM | TEMPERATURE: 98 F

## 2021-03-09 VITALS
TEMPERATURE: 98 F | RESPIRATION RATE: 16 BRPM | DIASTOLIC BLOOD PRESSURE: 67 MMHG | SYSTOLIC BLOOD PRESSURE: 116 MMHG | WEIGHT: 128.09 LBS | HEIGHT: 62 IN | HEART RATE: 98 BPM | OXYGEN SATURATION: 97 %

## 2021-03-09 DIAGNOSIS — Z98.890 OTHER SPECIFIED POSTPROCEDURAL STATES: Chronic | ICD-10-CM

## 2021-03-09 DIAGNOSIS — Z98.891 HISTORY OF UTERINE SCAR FROM PREVIOUS SURGERY: Chronic | ICD-10-CM

## 2021-03-09 DIAGNOSIS — N20.0 CALCULUS OF KIDNEY: ICD-10-CM

## 2021-03-09 LAB
CA-I BLD-SCNC: 1.06 MMOL/L — LOW (ref 1.15–1.29)
CALCIUM SERPL-MCNC: 7.9 MG/DL — LOW (ref 8.4–10.5)
HCG UR QL: NEGATIVE — SIGNIFICANT CHANGE UP

## 2021-03-09 RX ORDER — SODIUM CHLORIDE 9 MG/ML
1000 INJECTION, SOLUTION INTRAVENOUS
Refills: 0 | Status: DISCONTINUED | OUTPATIENT
Start: 2021-03-09 | End: 2021-03-24

## 2021-03-09 RX ORDER — CALCIUM CARBONATE 500(1250)
2 TABLET ORAL ONCE
Refills: 0 | Status: COMPLETED | OUTPATIENT
Start: 2021-03-09 | End: 2021-03-09

## 2021-03-09 RX ORDER — CALCIUM CARBONATE 500(1250)
1500 TABLET ORAL
Qty: 0 | Refills: 0 | DISCHARGE

## 2021-03-09 RX ORDER — ONDANSETRON 8 MG/1
4 TABLET, FILM COATED ORAL ONCE
Refills: 0 | Status: DISCONTINUED | OUTPATIENT
Start: 2021-03-09 | End: 2021-03-24

## 2021-03-09 RX ORDER — OXYCODONE HYDROCHLORIDE 5 MG/1
5 TABLET ORAL ONCE
Refills: 0 | Status: DISCONTINUED | OUTPATIENT
Start: 2021-03-09 | End: 2021-03-09

## 2021-03-09 RX ORDER — SODIUM CHLORIDE 9 MG/ML
1000 INJECTION, SOLUTION INTRAVENOUS
Refills: 0 | Status: DISCONTINUED | OUTPATIENT
Start: 2021-03-09 | End: 2021-03-09

## 2021-03-09 RX ORDER — FLUTICASONE PROPIONATE 220 MCG
2 AEROSOL WITH ADAPTER (GRAM) INHALATION
Qty: 0 | Refills: 0 | DISCHARGE

## 2021-03-09 RX ORDER — ALBUTEROL 90 UG/1
1 AEROSOL, METERED ORAL
Qty: 0 | Refills: 0 | DISCHARGE

## 2021-03-09 RX ORDER — HYDROMORPHONE HYDROCHLORIDE 2 MG/ML
0.5 INJECTION INTRAMUSCULAR; INTRAVENOUS; SUBCUTANEOUS
Refills: 0 | Status: DISCONTINUED | OUTPATIENT
Start: 2021-03-09 | End: 2021-03-09

## 2021-03-09 RX ORDER — OXYCODONE HYDROCHLORIDE 5 MG/1
1 TABLET ORAL
Qty: 20 | Refills: 0
Start: 2021-03-09 | End: 2021-03-13

## 2021-03-09 RX ORDER — CEPHALEXIN 500 MG
1 CAPSULE ORAL
Qty: 10 | Refills: 0
Start: 2021-03-09 | End: 2021-03-13

## 2021-03-09 RX ORDER — FLUCONAZOLE 150 MG/1
1 TABLET ORAL
Qty: 5 | Refills: 0
Start: 2021-03-09 | End: 2021-03-13

## 2021-03-09 RX ORDER — FLUCONAZOLE 150 MG/1
200 TABLET ORAL ONCE
Refills: 0 | Status: DISCONTINUED | OUTPATIENT
Start: 2021-03-09 | End: 2021-03-24

## 2021-03-09 RX ORDER — FEXOFENADINE HCL 30 MG
1 TABLET ORAL
Qty: 0 | Refills: 0 | DISCHARGE

## 2021-03-09 RX ORDER — MONTELUKAST 4 MG/1
1 TABLET, CHEWABLE ORAL
Qty: 0 | Refills: 0 | DISCHARGE

## 2021-03-09 RX ORDER — METOCLOPRAMIDE HCL 10 MG
10 TABLET ORAL ONCE
Refills: 0 | Status: DISCONTINUED | OUTPATIENT
Start: 2021-03-09 | End: 2021-03-09

## 2021-03-09 RX ORDER — FENTANYL CITRATE 50 UG/ML
50 INJECTION INTRAVENOUS
Refills: 0 | Status: DISCONTINUED | OUTPATIENT
Start: 2021-03-09 | End: 2021-03-09

## 2021-03-09 RX ADMIN — HYDROMORPHONE HYDROCHLORIDE 0.5 MILLIGRAM(S): 2 INJECTION INTRAMUSCULAR; INTRAVENOUS; SUBCUTANEOUS at 18:37

## 2021-03-09 RX ADMIN — OXYCODONE HYDROCHLORIDE 5 MILLIGRAM(S): 5 TABLET ORAL at 18:34

## 2021-03-09 RX ADMIN — HYDROMORPHONE HYDROCHLORIDE 0.5 MILLIGRAM(S): 2 INJECTION INTRAMUSCULAR; INTRAVENOUS; SUBCUTANEOUS at 18:20

## 2021-03-09 RX ADMIN — Medication 2 TABLET(S): at 15:12

## 2021-03-09 NOTE — ASU PREOP CHECKLIST - 3.
Patient c/o "tingling in finger tip " patient had parathyroidectomy done 2 weeks ago   Dr Magana and Dr beavers aware Calcium levels sent stat

## 2021-03-09 NOTE — BRIEF OPERATIVE NOTE - OPERATION/FINDINGS
multi:1:150  Cystoscopy performed. Right percutaneous nephrolithotomy performed, large stone identified in renal pelvis with additional small stone, all stones fragmented, specimen sent for pathology. Right kidney urine sent for culture. Antegrade insertion of right ureteral stent under direct vision and fluoroscopic guidance.

## 2021-03-09 NOTE — ASU DISCHARGE PLAN (ADULT/PEDIATRIC) - CARE PROVIDER_API CALL
Mike Jeffery)  Urology  [address] [facility] [facility] [address], [address]  [place] [address]  Phone: [phone]  Fax: [phone]  Follow Up Time: 2 weeks

## 2021-03-09 NOTE — ASU DISCHARGE PLAN (ADULT/PEDIATRIC) - CALL YOUR DOCTOR IF YOU HAVE ANY OF THE FOLLOWING:
Inability to tolerate liquids or foods Bleeding that does not stop/Swelling that gets worse/Pain not relieved by Medications/Fever greater than (need to indicate Fahrenheit or Celsius)/Wound/Surgical Site with redness, or foul smelling discharge or pus/Nausea and vomiting that does not stop/Unable to urinate/Inability to tolerate liquids or foods

## 2021-03-09 NOTE — ASU DISCHARGE PLAN (ADULT/PEDIATRIC) - ASU DC SPECIAL INSTRUCTIONSFT
It is common to have blood in the urine after your procedure.  It may be pink or even red; inform your doctor if you have a significant amount of clots in the urine or if you are unable to void at all.  Be sure to drink plenty of fluids at all times.  It is not uncommon to have some burning when you urinate or to even notice some stone fragments in your urine.  You have an internal stent (a hollow tube that runs from the kidney to your bladder) after your procedure, helping your kidney drain down to your bladder after your surgery.  Some patients do not notice that they have a stent, while others complain of the sensation of needing to urinate frequently, burning on urination, or even some back pain (especially when they go to urinate). These sensations usually improve gradually, some faster than others. This is not uncommon, but may initially warrant the use of the pain medication which you were prescribed.  While the stent is in place, your urine may continue to be bloody. This stent is temporary and must be removed by your urologist as an outpatient.    PAIN CONTROL: You may take 650 mg of Tylenol every 4-6 hours. Do not exceed 4 grams of Tylenol daily. Take oxycodone as needed for severe pain, one tab every 6 hours. Do not exceed 4 tabs daily.  WOUND CARE: You may remove the dressing after 24 hours. If saturated, you may remove sooner and replace with gauze bandage, if needed. Do not submerge wound underwater for 4 weeks, or until healed.  ANTIBIOTICS: Please complete the course of antibiotics sent to your pharmacy as instructed.  BATHING: Please do not submerge wound underwater. You may shower after 48 hours and/or sponge bathe.  ACTIVITY: No heavy lifting or straining. Otherwise, you may return to your usual level of physical activity. If you are taking narcotic pain medication (such as oxycodone), do NOT drive a car, operate machinery or make important decisions.  DIET: Return to your usual diet.  NOTIFY YOUR SURGEON IF: You have any bleeding that does not stop, any pus draining from your wound, any fever (over 100.4 F) or chills, persistent nausea/vomiting, persistent diarrhea, or if your pain is not controlled on your discharge pain medications.  FOLLOW-UP:  1. Please call your surgeon to make a follow-up appointment in 2 weeks

## 2021-03-09 NOTE — ASU DISCHARGE PLAN (ADULT/PEDIATRIC) - NURSING INSTRUCTIONS
DO NOT take any Tylenol (Acetaminophen) or narcotics containing Tylenol until after  10pm . You received Tylenol during your operation and it can cause damage to your liver if too much is taken within a 24 hour time period. No Ibuprofen/ Motrin until after 10pm.

## 2021-03-12 LAB
-  AMPICILLIN/SULBACTAM: SIGNIFICANT CHANGE UP
-  CEFAZOLIN: SIGNIFICANT CHANGE UP
-  DAPTOMYCIN: SIGNIFICANT CHANGE UP
-  GENTAMICIN: SIGNIFICANT CHANGE UP
-  LINEZOLID: SIGNIFICANT CHANGE UP
-  OXACILLIN: SIGNIFICANT CHANGE UP
-  PENICILLIN: SIGNIFICANT CHANGE UP
-  RIFAMPIN: SIGNIFICANT CHANGE UP
-  TETRACYCLINE: SIGNIFICANT CHANGE UP
-  TRIMETHOPRIM/SULFAMETHOXAZOLE: SIGNIFICANT CHANGE UP
-  VANCOMYCIN: SIGNIFICANT CHANGE UP
CULTURE RESULTS: SIGNIFICANT CHANGE UP
METHOD TYPE: SIGNIFICANT CHANGE UP
ORGANISM # SPEC MICROSCOPIC CNT: SIGNIFICANT CHANGE UP
ORGANISM # SPEC MICROSCOPIC CNT: SIGNIFICANT CHANGE UP
SPECIMEN SOURCE: SIGNIFICANT CHANGE UP

## 2021-03-13 LAB — NIDUS STONE QN: SIGNIFICANT CHANGE UP

## 2022-09-26 NOTE — DISCHARGE NOTE OB - CHANGE SANITARY PADS FREQUENTLY.  WASHING AND WIPING SHOULD OCCUR FROM FRONT TO BACK
Statement Selected CC of allergic reaction. pt was on [facility] and dx with sinus infection and prescribed with Augmentin and after 3 hrs started her eyes to itch and swells up. no hives upon assessment and uvula can be seen clearly.

## 2023-01-03 NOTE — H&P PST ADULT - VASCULAR
Pt to Phase 2 from IR, Pt is alert and oriented and denies pain at this time.  Vital signs stable on room air, Pt tolerating a drink and a snack detailed exam

## 2023-02-21 NOTE — ASU PATIENT PROFILE, ADULT - PRESSURE ULCER(S)
-- DO NOT REPLY / DO NOT REPLY ALL --  -- Message is from Engagement Center Operations (ECO) --    General Patient Message:    Patients daughter is requesting call back from office regarding patients appointment,      Caller Information       Type Contact Phone/Fax    02/21/2023 04:23 PM CST Phone (Incoming) Radha Blake (Emergency Contact) [phone]        Alternative phone number:none    Can a detailed message be left? Yes    Message Turnaround:     Is it Working Hours? Yes - Working Hours     [place]:    Please give this turnaround time to the caller:   \"This message will be sent to [state Provider's name]. The clinical team will fulfill your request as soon as they review your message.\"                 no

## 2025-02-23 ENCOUNTER — NON-APPOINTMENT (OUTPATIENT)
Age: 40
End: 2025-02-23